# Patient Record
Sex: MALE | Race: WHITE
[De-identification: names, ages, dates, MRNs, and addresses within clinical notes are randomized per-mention and may not be internally consistent; named-entity substitution may affect disease eponyms.]

---

## 2023-03-13 ENCOUNTER — HOSPITAL ENCOUNTER (OUTPATIENT)
Dept: HOSPITAL 76 - EMS | Age: 28
Discharge: ANOTHER HOSPITAL | End: 2023-03-13
Payer: MEDICAID

## 2023-03-13 ENCOUNTER — HOSPITAL ENCOUNTER (EMERGENCY)
Dept: HOSPITAL 76 - ED | Age: 28
LOS: 1 days | Discharge: HOME | End: 2023-03-14
Payer: MEDICAID

## 2023-03-13 DIAGNOSIS — R55: Primary | ICD-10-CM

## 2023-03-13 DIAGNOSIS — S01.91XA: ICD-10-CM

## 2023-03-13 DIAGNOSIS — R55: ICD-10-CM
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
## 2023-03-13 DIAGNOSIS — S01.81XA: Primary | ICD-10-CM

## 2023-03-13 LAB
ALBUMIN DIAFP-MCNC: 2.8 G/DL (ref 3.2–5.5)
ALBUMIN/GLOB SERPL: 0.8 {RATIO} (ref 1–2.2)
ALP SERPL-CCNC: 40 IU/L (ref 42–121)
ALT SERPL W P-5'-P-CCNC: 28 IU/L (ref 10–60)
ANION GAP SERPL CALCULATED.4IONS-SCNC: 7 MMOL/L (ref 6–13)
APAP SERPL-MCNC: 13 UG/ML (ref 10–30)
AST SERPL W P-5'-P-CCNC: 18 IU/L (ref 10–42)
BASOPHILS NFR BLD AUTO: 0 10^3/UL (ref 0–0.1)
BASOPHILS NFR BLD AUTO: 0.4 %
BILIRUB BLD-MCNC: 0.4 MG/DL (ref 0.2–1)
BUN SERPL-MCNC: 16 MG/DL (ref 6–20)
CALCIUM UR-MCNC: 7.6 MG/DL (ref 8.5–10.3)
CHLORIDE SERPL-SCNC: 105 MMOL/L (ref 101–111)
CO2 SERPL-SCNC: 26 MMOL/L (ref 21–32)
CREAT SERPLBLD-SCNC: 1 MG/DL (ref 0.6–1.2)
EOSINOPHIL # BLD AUTO: 0.1 10^3/UL (ref 0–0.7)
EOSINOPHIL NFR BLD AUTO: 1.2 %
ERYTHROCYTE [DISTWIDTH] IN BLOOD BY AUTOMATED COUNT: 12.4 % (ref 12–15)
ETHANOL BLD-MCNC: < 5 MG/DL
GFRSERPLBLD MDRD-ARVRAT: 90 ML/MIN/{1.73_M2} (ref 89–?)
GLOBULIN SER-MCNC: 3.6 G/DL (ref 2.1–4.2)
GLUCOSE SERPL-MCNC: 102 MG/DL (ref 70–100)
HCT VFR BLD AUTO: 32.3 % (ref 42–52)
HGB UR QL STRIP: 10.4 G/DL (ref 14–18)
LIPASE SERPL-CCNC: 31 U/L (ref 22–51)
LYMPHOCYTES # SPEC AUTO: 1.2 10^3/UL (ref 1.5–3.5)
LYMPHOCYTES NFR BLD AUTO: 16.8 %
MCH RBC QN AUTO: 28.3 PG (ref 27–31)
MCHC RBC AUTO-ENTMCNC: 32.2 G/DL (ref 32–36)
MCV RBC AUTO: 88 FL (ref 80–94)
MONOCYTES # BLD AUTO: 0.6 10^3/UL (ref 0–1)
MONOCYTES NFR BLD AUTO: 7.7 %
NEUTROPHILS # BLD AUTO: 5.4 10^3/UL (ref 1.5–6.6)
NEUTROPHILS # SNV AUTO: 7.3 X10^3/UL (ref 4.8–10.8)
NEUTROPHILS NFR BLD AUTO: 73.8 %
NRBC # BLD AUTO: 0 /100WBC
NRBC # BLD AUTO: 0 X10^3/UL
PDW BLD AUTO: 8.6 FL (ref 7.4–11.4)
PLATELET # BLD: 301 10^3/UL (ref 130–450)
POTASSIUM SERPL-SCNC: 3.1 MMOL/L (ref 3.5–5)
PROT SPEC-MCNC: 6.4 G/DL (ref 6.7–8.2)
RBC MAR: 3.67 10^6/UL (ref 4.7–6.1)
SALICYLATES SERPL-MCNC: < 6 MG/DL
SODIUM SERPLBLD-SCNC: 138 MMOL/L (ref 135–145)

## 2023-03-13 PROCEDURE — 36415 COLL VENOUS BLD VENIPUNCTURE: CPT

## 2023-03-13 PROCEDURE — 71046 X-RAY EXAM CHEST 2 VIEWS: CPT

## 2023-03-13 PROCEDURE — 93005 ELECTROCARDIOGRAM TRACING: CPT

## 2023-03-13 PROCEDURE — 81001 URINALYSIS AUTO W/SCOPE: CPT

## 2023-03-13 PROCEDURE — 81003 URINALYSIS AUTO W/O SCOPE: CPT

## 2023-03-13 PROCEDURE — 99284 EMERGENCY DEPT VISIT MOD MDM: CPT

## 2023-03-13 PROCEDURE — 87086 URINE CULTURE/COLONY COUNT: CPT

## 2023-03-13 PROCEDURE — 80320 DRUG SCREEN QUANTALCOHOLS: CPT

## 2023-03-13 PROCEDURE — 90471 IMMUNIZATION ADMIN: CPT

## 2023-03-13 PROCEDURE — 85025 COMPLETE CBC W/AUTO DIFF WBC: CPT

## 2023-03-13 PROCEDURE — 80053 COMPREHEN METABOLIC PANEL: CPT

## 2023-03-13 PROCEDURE — 80306 DRUG TEST PRSMV INSTRMNT: CPT

## 2023-03-13 PROCEDURE — 80329 ANALGESICS NON-OPIOID 1 OR 2: CPT

## 2023-03-13 PROCEDURE — 90715 TDAP VACCINE 7 YRS/> IM: CPT

## 2023-03-13 PROCEDURE — 83690 ASSAY OF LIPASE: CPT

## 2023-03-13 PROCEDURE — 96360 HYDRATION IV INFUSION INIT: CPT

## 2023-03-13 PROCEDURE — 80307 DRUG TEST PRSMV CHEM ANLYZR: CPT

## 2023-03-13 PROCEDURE — 84443 ASSAY THYROID STIM HORMONE: CPT

## 2023-03-13 NOTE — ED PHYSICIAN DOCUMENTATION
History of Present Illness





- Stated complaint


Stated Complaint: SYNCOPE, HEAD LAC





- Chief complaint


Chief Complaint: Neuro





- History obtained from


History obtained from: Patient, EMS





- Additonal information


Additional information: 





27-year-old man presented status post syncopal episode witnessed about 30 

minutes prior to arrival at his addiction treatment facility. Patient states he 

didnt have dinner and took his nighttime meds, then felt lightheaded and 

fainted. EMS reports 4mg IN narcan was administered on scene. EMS on initial 

arrival found his SBP to be in 70s and HR in 50s, improving s/p 500cc IVF. 

Reportedly there was some fentanyl snuck into the facility but patient denies 

taking any. He does have a laceration to R forehead where he fell but otherwise 

is without complaint.





PD PAST MEDICAL HISTORY





- Allergies


Allergies/Adverse Reactions: 


                                    Allergies











Allergy/AdvReac Type Severity Reaction Status Date / Time


 


No Known Drug Allergies Allergy   Verified 03/13/23 22:00














PD ED PE NORMAL





- Vitals


Vital signs reviewed: Yes





- General


General: Alert and oriented X 3, No acute distress, Well developed/nourished





- HEENT


HEENT: Atraumatic, PERRL, EOMI, Other (1cm laceration of R browline)





- Neck


Neck: No bony TTP, C-Spine cleared by NEXUS criteria





- Cardiac


Cardiac: RRR





- Respiratory


Respiratory: No respiratory distress, Clear bilaterally





- Abdomen


Abdomen: Non tender, Non distended





- Back


Back: No spinal TTP





- Derm


Derm: Normal color, Warm and dry





- Neuro


Neuro: Alert and oriented X 3, CNs 2-12 intact, No motor deficit, No sensory 

deficit


Eye Opening: Spontaneous


Motor: Obeys Commands


Verbal: Oriented


GCS Score: 15





Results





- Vitals


Vitals: 


                               Vital Signs - 24 hr











  03/13/23 03/13/23 03/14/23





  22:00 22:04 00:04


 


Temperature 37.3 C  


 


Heart Rate 70 74 84


 


Respiratory 15 20 16





Rate   


 


Blood Pressure 110/74 108/77 107/76


 


O2 Saturation 100 100 100








                                     Oxygen











O2 Source                      Room air

















- EKG (time done)


  ** 2156


EKG releavant findings:: 


EKG personally interpreted by author of this note. Relevant findings are: 





Rate: Rate (enter#) (71)


Rhythm: NSR


Axis: Normal


Intervals: Normal [place]


QRS: Normal


Ischemia: Normal ST segments





- Labs


Labs: 


                                Laboratory Tests











  03/13/23 03/13/23 03/13/23





  22:42 22:42 22:42


 


WBC  7.3  


 


RBC  3.67 L  


 


Hgb  10.4 L  


 


Hct  32.3 L  


 


MCV  88.0  


 


MCH  28.3  


 


MCHC  32.2  


 


RDW  12.4  


 


Plt Count  301  


 


MPV  8.6  


 


Neut # (Auto)  5.4  


 


Lymph # (Auto)  1.2 L  


 


Mono # (Auto)  0.6  


 


Eos # (Auto)  0.1  


 


Baso # (Auto)  0.0  


 


Absolute Nucleated RBC  0.00  


 


Nucleated RBC %  0.0  


 


Sodium   138 


 


Potassium   3.1 L 


 


Chloride   105 


 


Carbon Dioxide   26 


 


Anion Gap   7.0 


 


BUN   16 


 


Creatinine   1.0 


 


Estimated GFR (MDRD)   90 


 


Glucose   102 H 


 


Calcium   7.6 L 


 


Total Bilirubin   0.4 


 


AST   18 


 


ALT   28 


 


Alkaline Phosphatase   40 L 


 


Total Protein   6.4 L 


 


Albumin   2.8 L 


 


Globulin   3.6 


 


Albumin/Globulin Ratio   0.8 L 


 


Lipase   31 


 


TSH    1.81


 


Urine Color   


 


Urine Clarity   


 


Urine pH   


 


Ur Specific Gravity   


 


Urine Protein   


 


Urine Glucose (UA)   


 


Urine Ketones   


 


Urine Occult Blood   


 


Urine Nitrite   


 


Urine Bilirubin   


 


Urine Urobilinogen   


 


Ur Leukocyte Esterase   


 


Ur Microscopic Review   


 


Urine Culture Comments   


 


Salicylates   < 6.0 


 


Acetaminophen   13 


 


Ethyl Alcohol   < 5.0 














  03/14/23





  00:10


 


WBC 


 


RBC 


 


Hgb 


 


Hct 


 


MCV 


 


MCH 


 


MCHC 


 


RDW 


 


Plt Count 


 


MPV 


 


Neut # (Auto) 


 


Lymph # (Auto) 


 


Mono # (Auto) 


 


Eos # (Auto) 


 


Baso # (Auto) 


 


Absolute Nucleated RBC 


 


Nucleated RBC % 


 


Sodium 


 


Potassium 


 


Chloride 


 


Carbon Dioxide 


 


Anion Gap 


 


BUN 


 


Creatinine 


 


Estimated GFR (MDRD) 


 


Glucose 


 


Calcium 


 


Total Bilirubin 


 


AST 


 


ALT 


 


Alkaline Phosphatase 


 


Total Protein 


 


Albumin 


 


Globulin 


 


Albumin/Globulin Ratio 


 


Lipase 


 


TSH 


 


Urine Color  YELLOW


 


Urine Clarity  CLEAR


 


Urine pH  6.0


 


Ur Specific Gravity  1.020


 


Urine Protein  NEGATIVE


 


Urine Glucose (UA)  NEGATIVE


 


Urine Ketones  TRACE


 


Urine Occult Blood  NEGATIVE


 


Urine Nitrite  NEGATIVE


 


Urine Bilirubin  NEGATIVE


 


Urine Urobilinogen  0.2 (NORMAL)


 


Ur Leukocyte Esterase  NEGATIVE


 


Ur Microscopic Review  NOT INDICATED


 


Urine Culture Comments  NOT INDICATED


 


Salicylates 


 


Acetaminophen 


 


Ethyl Alcohol 














PD Medical Decision Making





- ED course


ED course: 











27yM presents s/p syncopal episode. cbc, abdominal panel, tox labs, urine DS, 

ekg, and cxr ordered. Labwork benign aside from mild hypokalemia. Potassium 

repleted. ekg nsr. CXR with mild interstitial disease per radiologist and my 

independent interpretation, but clinically patient is without signs/symptoms of 

respiratory disease. His vital signs are normal here in the ED and parenteral 

rehyadraton was completed with the understanding that he may have had a 

vasovagal episode versus drug related syncope. Cardiac cause extremely unlikely 

given normal cardiac monitoring and normal ekg. Patient refused laceration 

repair. plan to dc. return precautions given.





Departure





- Departure


Disposition: 01 Home, Self Care


Clinical Impression: 


 Syncope and collapse, Laceration





Condition: Stable


Instructions:  ED Laceration All


Comments: 


You were seen in the emergency department after fainting and cutting your right 

upper face.  Please monitor for signs of infection and return to the emergency 

department if you have other concerns.  Follow-up with your primary care 

provider.

## 2023-03-13 NOTE — XRAY REPORT
PROCEDURE:  Chest 2 View X-Ray

 

INDICATIONS:  syncope

 

TECHNIQUE:  2 views of the chest were acquired.  

 

COMPARISON:  None.

 

FINDINGS:  

 

Surgical changes and devices:  None.  

 

Lungs and pleura: Diffuse interstitial prominence. Mild perihilar airway thickening. No focal consoli
dation. No substantial pleural effusion. No pneumothorax.

 

Mediastinum:  Mediastinal contours are normal.  Heart size is normal.  

 

Bones and chest wall:  No suspicious bony abnormalities.  Soft tissues appear unremarkable.  

 

IMPRESSION:  

Diffuse interstitial prominence with mild perihilar airway thickening. Findings are nonspecific but m
ay represent an infectious or inflammatory process. No focal consolidation seen.

 

Reviewed by: Andrea Broderick MD on 3/13/2023 10:02 PM [facility]

Approved by: Andrea Broderick MD on 3/13/2023 10:02 PM [facility]

 

 

Station ID:  [id number]

## 2023-03-14 VITALS — SYSTOLIC BLOOD PRESSURE: 127 MMHG | DIASTOLIC BLOOD PRESSURE: 84 MMHG

## 2023-03-14 LAB
AMPHET UR QL SCN: NEGATIVE
BARBITURATES UR QL SCN>300 NG/ML: NEGATIVE
BENZODIAZ UR QL SCN: NEGATIVE
CLARITY UR REFRACT.AUTO: CLEAR
COCAINE UR-SCNC: POSITIVE UMOL/L
GLUCOSE UR QL STRIP.AUTO: NEGATIVE MG/DL
KETONES UR QL STRIP.AUTO: (no result) MG/DL
METHADONE UR QL SCN: NEGATIVE
METHAMPHET UR QL SCN: NEGATIVE
NITRITE UR QL STRIP.AUTO: NEGATIVE
OPIATES UR QL SCN: NEGATIVE
PH UR STRIP.AUTO: 6 PH (ref 5–7.5)
PROT UR STRIP.AUTO-MCNC: NEGATIVE MG/DL
RBC # UR STRIP.AUTO: NEGATIVE /UL
SP GR UR STRIP.AUTO: 1.02 (ref 1–1.03)
THC UR QL SCN: NEGATIVE
UROBILINOGEN UR QL STRIP.AUTO: (no result) E.U./DL
UROBILINOGEN UR STRIP.AUTO-MCNC: NEGATIVE MG/DL
VOLATILE DRUGS POS SERPL SCN: (no result)